# Patient Record
Sex: FEMALE | Race: WHITE | NOT HISPANIC OR LATINO | Employment: FULL TIME | URBAN - METROPOLITAN AREA
[De-identification: names, ages, dates, MRNs, and addresses within clinical notes are randomized per-mention and may not be internally consistent; named-entity substitution may affect disease eponyms.]

---

## 2021-07-08 ENCOUNTER — TELEPHONE (OUTPATIENT)
Dept: OBSTETRICS AND GYNECOLOGY | Facility: CLINIC | Age: 36
End: 2021-07-08

## 2021-07-08 NOTE — TELEPHONE ENCOUNTER
Nichelle,     She is pregnant and was seen at [facility].   Can not see where she is getting care. They found UTI.   Can you reach out and see if she wants to follow up here for ob care.     Thanks,   Dr. Anderson

## 2021-12-02 ENCOUNTER — HOSPITAL ENCOUNTER (EMERGENCY)
Facility: HOSPITAL | Age: 36
Discharge: HOME | End: 2021-12-02
Attending: OBSTETRICS & GYNECOLOGY | Admitting: OBSTETRICS & GYNECOLOGY

## 2021-12-02 VITALS
SYSTOLIC BLOOD PRESSURE: 124 MMHG | HEART RATE: 85 BPM | OXYGEN SATURATION: 100 % | BODY MASS INDEX: 45.14 KG/M2 | DIASTOLIC BLOOD PRESSURE: 86 MMHG | TEMPERATURE: 98.3 F | HEIGHT: 58 IN | RESPIRATION RATE: 16 BRPM

## 2021-12-02 LAB
ALBUMIN SERPL-MCNC: 3.3 G/DL (ref 3.5–5.2)
ALBUMIN/GLOB SERPL: 1.1 G/DL
ALP SERPL-CCNC: 95 U/L (ref 39–117)
ALT SERPL W P-5'-P-CCNC: 7 U/L (ref 1–33)
ANION GAP SERPL CALCULATED.3IONS-SCNC: 9.6 MMOL/L (ref 5–15)
AST SERPL-CCNC: 11 U/L (ref 1–32)
BASOPHILS # BLD AUTO: 0.01 10*3/MM3 (ref 0–0.2)
BASOPHILS NFR BLD AUTO: 0.1 % (ref 0–1.5)
BILIRUB SERPL-MCNC: <0.2 MG/DL (ref 0–1.2)
BILIRUB UR QL STRIP: NEGATIVE
BUN SERPL-MCNC: 9 MG/DL (ref 6–20)
BUN/CREAT SERPL: 12.3 (ref 7–25)
CALCIUM SPEC-SCNC: 8.5 MG/DL (ref 8.6–10.5)
CHLORIDE SERPL-SCNC: 108 MMOL/L (ref 98–107)
CLARITY UR: CLEAR
CO2 SERPL-SCNC: 19.4 MMOL/L (ref 22–29)
COLOR UR: YELLOW
CREAT SERPL-MCNC: 0.73 MG/DL (ref 0.57–1)
CREAT UR-MCNC: 134.1 MG/DL
DEPRECATED RDW RBC AUTO: 39 FL (ref 37–54)
EOSINOPHIL # BLD AUTO: 0.1 10*3/MM3 (ref 0–0.4)
EOSINOPHIL NFR BLD AUTO: 0.8 % (ref 0.3–6.2)
ERYTHROCYTE [DISTWIDTH] IN BLOOD BY AUTOMATED COUNT: 11.7 % (ref 12.3–15.4)
GFR SERPL CREATININE-BSD FRML MDRD: 90 ML/MIN/1.73
GLOBULIN UR ELPH-MCNC: 3 GM/DL
GLUCOSE SERPL-MCNC: 93 MG/DL (ref 65–99)
GLUCOSE UR STRIP-MCNC: NEGATIVE MG/DL
HCT VFR BLD AUTO: 30.1 % (ref 34–46.6)
HGB BLD-MCNC: 10.1 G/DL (ref 12–15.9)
HGB UR QL STRIP.AUTO: NEGATIVE
IMM GRANULOCYTES # BLD AUTO: 0.08 10*3/MM3 (ref 0–0.05)
IMM GRANULOCYTES NFR BLD AUTO: 0.7 % (ref 0–0.5)
KETONES UR QL STRIP: NEGATIVE
LEUKOCYTE ESTERASE UR QL STRIP.AUTO: NEGATIVE
LYMPHOCYTES # BLD AUTO: 2.08 10*3/MM3 (ref 0.7–3.1)
LYMPHOCYTES NFR BLD AUTO: 17 % (ref 19.6–45.3)
MCH RBC QN AUTO: 30.8 PG (ref 26.6–33)
MCHC RBC AUTO-ENTMCNC: 33.6 G/DL (ref 31.5–35.7)
MCV RBC AUTO: 91.8 FL (ref 79–97)
MONOCYTES # BLD AUTO: 0.64 10*3/MM3 (ref 0.1–0.9)
MONOCYTES NFR BLD AUTO: 5.2 % (ref 5–12)
NEUTROPHILS NFR BLD AUTO: 76.2 % (ref 42.7–76)
NEUTROPHILS NFR BLD AUTO: 9.29 10*3/MM3 (ref 1.7–7)
NITRITE UR QL STRIP: NEGATIVE
NRBC BLD AUTO-RTO: 0 /100 WBC (ref 0–0.2)
PH UR STRIP.AUTO: 6 [PH] (ref 5–8)
PLATELET # BLD AUTO: 319 10*3/MM3 (ref 140–450)
PMV BLD AUTO: 11 FL (ref 6–12)
POTASSIUM SERPL-SCNC: 4.4 MMOL/L (ref 3.5–5.2)
PROT ?TM UR-MCNC: 26 MG/DL
PROT SERPL-MCNC: 6.3 G/DL (ref 6–8.5)
PROT UR QL STRIP: ABNORMAL
PROT/CREAT UR: 193.9 MG/G CREA (ref 0–200)
RBC # BLD AUTO: 3.28 10*6/MM3 (ref 3.77–5.28)
SODIUM SERPL-SCNC: 137 MMOL/L (ref 136–145)
SP GR UR STRIP: 1.02 (ref 1–1.03)
UROBILINOGEN UR QL STRIP: ABNORMAL
WBC NRBC COR # BLD: 12.2 10*3/MM3 (ref 3.4–10.8)

## 2021-12-02 PROCEDURE — 80053 COMPREHEN METABOLIC PANEL: CPT | Performed by: OBSTETRICS & GYNECOLOGY

## 2021-12-02 PROCEDURE — 81003 URINALYSIS AUTO W/O SCOPE: CPT | Performed by: OBSTETRICS & GYNECOLOGY

## 2021-12-02 PROCEDURE — 87086 URINE CULTURE/COLONY COUNT: CPT | Performed by: OBSTETRICS & GYNECOLOGY

## 2021-12-02 PROCEDURE — 82570 ASSAY OF URINE CREATININE: CPT | Performed by: OBSTETRICS & GYNECOLOGY

## 2021-12-02 PROCEDURE — 59025 FETAL NON-STRESS TEST: CPT

## 2021-12-02 PROCEDURE — 63710000001 PROMETHAZINE PER 25 MG: Performed by: OBSTETRICS & GYNECOLOGY

## 2021-12-02 PROCEDURE — 85025 COMPLETE CBC W/AUTO DIFF WBC: CPT | Performed by: OBSTETRICS & GYNECOLOGY

## 2021-12-02 PROCEDURE — 84156 ASSAY OF PROTEIN URINE: CPT | Performed by: OBSTETRICS & GYNECOLOGY

## 2021-12-02 PROCEDURE — 99284 EMERGENCY DEPT VISIT MOD MDM: CPT | Performed by: OBSTETRICS & GYNECOLOGY

## 2021-12-02 RX ORDER — PROMETHAZINE HYDROCHLORIDE 25 MG/1
12.5 TABLET ORAL ONCE
Status: COMPLETED | OUTPATIENT
Start: 2021-12-02 | End: 2021-12-02

## 2021-12-02 RX ORDER — ACETAMINOPHEN 500 MG
1000 TABLET ORAL ONCE
Status: COMPLETED | OUTPATIENT
Start: 2021-12-02 | End: 2021-12-02

## 2021-12-02 RX ORDER — SODIUM CHLORIDE 0.9 % (FLUSH) 0.9 %
10 SYRINGE (ML) INJECTION AS NEEDED
Status: DISCONTINUED | OUTPATIENT
Start: 2021-12-02 | End: 2021-12-02 | Stop reason: HOSPADM

## 2021-12-02 RX ORDER — SODIUM CHLORIDE 0.9 % (FLUSH) 0.9 %
10 SYRINGE (ML) INJECTION EVERY 12 HOURS SCHEDULED
Status: DISCONTINUED | OUTPATIENT
Start: 2021-12-02 | End: 2021-12-02 | Stop reason: HOSPADM

## 2021-12-02 RX ORDER — FERROUS SULFATE 325(65) MG
325 TABLET ORAL
COMMUNITY
End: 2022-08-19

## 2021-12-02 RX ADMIN — ACETAMINOPHEN 1000 MG: 500 TABLET, FILM COATED ORAL at 20:30

## 2021-12-02 RX ADMIN — PROMETHAZINE HYDROCHLORIDE 12.5 MG: 25 TABLET ORAL at 20:29

## 2021-12-03 NOTE — OBED NOTES
PHAN Note OB        Patient Name: Lexis Hernandez  YOB: 1985  MRN: [id number]  Admission Date: 2021  6:47 PM  Date of Service: 2021    Chief Complaint: Elevated Blood Pressure ([facility]AN - pt presents to [facility] with c/o of elevated BPs at home, ranging 142-200/. pt reports excessive swelling in BLE and BUE, headaches. reports lower back pain. denies leaking fluid, vaginal bleeding and contractions. good fetal movement.)        Subjective     Lexis Hernandez is a 36 y.o. female  at 33w2d with Estimated Date of Delivery: 22 who presents with the chief complaint listed above.  She sees Whit Wen MD for her prenatal care. Her pregnancy has been complicated by:  patient reports high blood pressure for last month.  Unable to view outside records..    She denies a history of CHTN.  She reports more lower extremity edema over past couple of weeks, especially at work and at the end of the day.  She reports blood pressures being elevated at home today- she reports her boss took her blood pressure at work and it was in the 170s/100s and that her sister in nursing school took her BP and it was also severe range.  These cuffs have not been calibrated and patient unable to tell me if cuff correct size, etc.  Here, blood pressure has been normal to mild range.  Patient denies visual changes, RUQ/epigastric pain or shortness of breath.  She does have a mild headache that she states is typical for her.  She has not been sleeping well and thinks being tired is contributing to this.      She describes fetal movement as normal.  She denies rupture of membranes.  She denies vaginal bleeding. She is not feeling contractions.          Objective   There are no problems to display for this patient.       OB History    Para Term  AB Living   1 0 0 0 0 0   SAB IAB Ectopic Molar Multiple Live Births   0 0 0 0 0 0      # Outcome Date GA Lbr Ricardo/2nd Weight Sex Delivery Anes PTL Lv   1  Current                 Past Medical History:   Diagnosis Date   • GERD (gastroesophageal reflux disease)    • Seizures (HCC)     isolated incident in high school, migraines       Past Surgical History:   Procedure Laterality Date   • ABDOMINAL SURGERY     • ENDOSCOPY         No current facility-administered medications on file prior to encounter.     Current Outpatient Medications on File Prior to Encounter   Medication Sig Dispense Refill   • ferrous sulfate 325 (65 FE) MG tablet Take 325 mg by mouth Daily With Breakfast.     • folic acid (FOLVITE) 1 MG tablet   0 Refill(s)     • pantoprazole (PROTONIX) 20 MG EC tablet Take 20 mg by mouth Daily.     • Prenatal Vit-Fe Fumarate-FA (PRENATAL VITAMINS PO)      • promethazine (PHENERGAN) 25 MG tablet Take 1 tablet by mouth Every 8 (Eight) Hours As Needed for Nausea or Vomiting. 10 tablet 0       Allergies   Allergen Reactions   • Penicillin G Shortness Of Breath   • Diphenhydramine Hallucinations       Family History   Problem Relation Age of Onset   • Hypertension Mother    • Diabetes Father        Social History     Socioeconomic History   • Marital status: Single   Tobacco Use   • Smoking status: Never Smoker   • Smokeless tobacco: Never Used   Vaping Use   • Vaping Use: Never used   Substance and Sexual Activity   • Alcohol use: No   • Drug use: No   • Sexual activity: Defer           Review of Systems   Constitutional: Positive for activity change and fatigue. Negative for chills and fever.   HENT: Negative for congestion, rhinorrhea and sore throat.    Eyes: Negative for visual disturbance.   Respiratory: Negative.    Cardiovascular: Positive for leg swelling.   Gastrointestinal: Negative for abdominal pain, constipation, diarrhea, nausea and vomiting.   Genitourinary: Negative for difficulty urinating, dyspareunia, dysuria, flank pain, frequency, genital sores, hematuria, pelvic pain, urgency, vaginal bleeding, vaginal discharge and vaginal pain.   Neurological:  "Positive for headaches. Negative for dizziness, seizures and light-headedness.   Psychiatric/Behavioral: Negative for sleep disturbance. The patient is not nervous/anxious.           PHYSICAL EXAM:      VITAL SIGNS:  Vitals:    21 1900   BP: 144/85   Pulse: 92   Resp: 18   Temp: 98.3 °F (36.8 °C)   TempSrc: Oral   SpO2: 100%   Height: 147.3 cm (58\")        FHT'S:                   Baseline:  145 BPM  Variability:  Moderate = 6 - 25 BPM  Accelerations:  15 x 15 accelerations present     Decelerations:  absent  Contractions:   absent     Interpretation:    Reactive NST, CAT 1 tracing        PHYSICAL EXAM:    General: well developed; well nourished  no acute distress   Heart: Not performed.   Lungs  : breathing is unlabored     Abdomen: soft, non-tender; no masses  no umbilical or inguinal hernias are present  no hepato-splenomegaly       Cervix: was not checked.      Contractions: none        Extremities: 2+ bilateral pitting edema of lower extremities and hands      LABS AND TESTING ORDERED:  1. Uterine and fetal monitoring  2. Urinalysis  3. CBC, CMP, urine p/c    LAB RESULTS:    No results found for this or any previous visit (from the past 24 hour(s)).    No results found for: ABO, RH    No results found for: STREPGPB              Assessment/Plan     ASSESSMENT/PLAN:  Lexis Hernandez is a 36 y.o. female  at 33w2d who presented with: elevated blood pressure, headache, and swelling.  Headache seems to be typical headache that patient gets when fatigued.  Headache treated with phenergan and tylenol.   She has no other severe symptoms. Pre-eclampsia labs sent and normal.  Fetal status reassuring with reactive NST.  Patient monitored for almost two hours with blood pressures resolving to normal range.  Suspect gestational hypertension as current diagnosis without evidence of severe pre-eclampsia.  Patient given work note for tomorrow and she has scheduled follow-up with doctor in four days.  She was given strict " "return precautions for severe symptoms.  Discharged home.          Final Impression:  • Pregnancy at 33w2d  • Reactive NST.  CAT 1 tracing  • Gestational hypertension  • Maternal vital signs were reviewed and were as noted above             Vitals:    12/02/21 1900   BP: 144/85   Pulse: 92   Resp: 18   Temp: 98.3 °F (36.8 °C)   TempSrc: Oral   SpO2: 100%   Height: 147.3 cm (58\")   •     • No results found for: STREPGPB  • No results found for: ABO, RH  • COVID - 19 status unknown      PLAN:       I have spent 45 minutes including face to face time with the patient, greater than 50% in discussion of the diagnosis (counseling) and/or coordination of care.     Whit Wen MD  12/2/2021  19:37 EST  OB Hospitalist  Phone:  [phone]  "

## 2021-12-03 NOTE — NURSING NOTE
Pt discharged home with s/s of gestational HTN.  Pt was already aware of, but RN discussed again. Pt to keep appt on Monday with primary OB.  Pt to remain off of work until then.

## 2021-12-04 LAB — BACTERIA SPEC AEROBE CULT: NO GROWTH

## 2024-01-31 ENCOUNTER — TELEPHONE (OUTPATIENT)
Dept: URGENT CARE | Facility: CLINIC | Age: 39
End: 2024-01-31
Payer: COMMERCIAL

## 2024-05-07 ENCOUNTER — HOSPITAL ENCOUNTER (OUTPATIENT)
Facility: HOSPITAL | Age: 39
Discharge: HOME | End: 2024-05-07
Attending: EMERGENCY MEDICINE | Admitting: EMERGENCY MEDICINE
Payer: COMMERCIAL

## 2024-05-07 VITALS
BODY MASS INDEX: 39.88 KG/M2 | RESPIRATION RATE: 16 BRPM | SYSTOLIC BLOOD PRESSURE: 112 MMHG | WEIGHT: 190 LBS | HEIGHT: 58 IN | DIASTOLIC BLOOD PRESSURE: 99 MMHG | HEART RATE: 94 BPM | OXYGEN SATURATION: 98 % | TEMPERATURE: 98.7 F

## 2024-05-07 DIAGNOSIS — J06.9 UPPER RESPIRATORY TRACT INFECTION, UNSPECIFIED TYPE: Primary | ICD-10-CM

## 2024-05-07 LAB
FLUAV SUBTYP SPEC NAA+PROBE: NOT DETECTED
FLUBV RNA ISLT QL NAA+PROBE: NOT DETECTED
SARS-COV-2 RNA RESP QL NAA+PROBE: NOT DETECTED

## 2024-05-07 PROCEDURE — 25010000002 DEXAMETHASONE PER 1 MG

## 2024-05-07 PROCEDURE — 87636 SARSCOV2 & INF A&B AMP PRB: CPT | Performed by: EMERGENCY MEDICINE

## 2024-05-07 PROCEDURE — G0463 HOSPITAL OUTPT CLINIC VISIT: HCPCS

## 2024-05-07 RX ORDER — IPRATROPIUM BROMIDE AND ALBUTEROL SULFATE 2.5; .5 MG/3ML; MG/3ML
3 SOLUTION RESPIRATORY (INHALATION) ONCE
Status: COMPLETED | OUTPATIENT
Start: 2024-05-07 | End: 2024-05-07

## 2024-05-07 RX ORDER — ALBUTEROL SULFATE 90 UG/1
2 AEROSOL, METERED RESPIRATORY (INHALATION) EVERY 4 HOURS PRN
Qty: 6.7 G | Refills: 0 | Status: SHIPPED | OUTPATIENT
Start: 2024-05-07

## 2024-05-07 RX ORDER — DEXTROMETHORPHAN HYDROBROMIDE AND PROMETHAZINE HYDROCHLORIDE 15; 6.25 MG/5ML; MG/5ML
5 SYRUP ORAL 4 TIMES DAILY PRN
Qty: 118 ML | Refills: 0 | Status: SHIPPED | OUTPATIENT
Start: 2024-05-07

## 2024-05-07 RX ADMIN — DEXAMETHASONE SODIUM PHOSPHATE 10 MG: 10 INJECTION, SOLUTION INTRAMUSCULAR; INTRAVENOUS at 16:38

## 2024-05-07 RX ADMIN — IPRATROPIUM BROMIDE AND ALBUTEROL SULFATE 3 ML: 2.5; .5 SOLUTION RESPIRATORY (INHALATION) at 16:38

## 2024-11-11 ENCOUNTER — HOSPITAL ENCOUNTER (OUTPATIENT)
Facility: HOSPITAL | Age: 39
Discharge: HOME | End: 2024-11-11
Attending: STUDENT IN AN ORGANIZED HEALTH CARE EDUCATION/TRAINING PROGRAM | Admitting: NURSE PRACTITIONER
Payer: COMMERCIAL

## 2024-11-11 ENCOUNTER — APPOINTMENT (OUTPATIENT)
Dept: GENERAL RADIOLOGY | Facility: HOSPITAL | Age: 39
End: 2024-11-11
Payer: COMMERCIAL

## 2024-11-11 VITALS
HEART RATE: 74 BPM | HEIGHT: 58 IN | BODY MASS INDEX: 39.88 KG/M2 | SYSTOLIC BLOOD PRESSURE: 153 MMHG | RESPIRATION RATE: 14 BRPM | WEIGHT: 190 LBS | TEMPERATURE: 97.9 F | DIASTOLIC BLOOD PRESSURE: 112 MMHG | OXYGEN SATURATION: 100 %

## 2024-11-11 DIAGNOSIS — T14.8XXA PUNCTURE WOUND: Primary | ICD-10-CM

## 2024-11-11 PROCEDURE — G0463 HOSPITAL OUTPT CLINIC VISIT: HCPCS | Performed by: NURSE PRACTITIONER

## 2024-11-11 PROCEDURE — 73140 X-RAY EXAM OF FINGER(S): CPT

## 2024-11-11 RX ORDER — DOXYCYCLINE 100 MG/1
100 CAPSULE ORAL 2 TIMES DAILY
Qty: 20 CAPSULE | Refills: 0 | Status: SHIPPED | OUTPATIENT
Start: 2024-11-11 | End: 2024-11-21

## 2024-11-11 RX ORDER — NAPROXEN 500 MG/1
500 TABLET ORAL 2 TIMES DAILY PRN
Qty: 14 TABLET | Refills: 0 | Status: SHIPPED | OUTPATIENT
Start: 2024-11-11 | End: 2024-11-18

## 2024-11-11 NOTE — LETTER
November 13, 2024        To Whom It May Concern:    Please excuse Lexis Hernandez from work for 2 days.    We know you have a choice. Thank you for making it [facility].    Patient: Lexis Hernandez   YOB: 1985   Date of Visit: 11/11/2024                   CC: No Recipients

## 2024-11-11 NOTE — FSED PROVIDER NOTE
Subjective   History of Present Illness  38 yr female was working with a drill bit and ended up with a punch to the left thum distal joint.  Not arpita how far it went in.  Are is swollen and sore.  Says it did bleed and she cleaned it good.  Thumb is throbbing now.  No fever.  No injury to any other part of left hand.        Review of Systems   Constitutional: Negative.    Respiratory: Negative.     Musculoskeletal:  Positive for joint swelling.        Left thumb with puncture wound, swelling, and tenderness       Past Medical History:   Diagnosis Date    GERD (gastroesophageal reflux disease)     Seizures     isolated incident in high school, migraines       Allergies   Allergen Reactions    Penicillin G Shortness Of Breath    Diphenhydramine Hallucinations    Pseudoeph-Doxylamine-Dm-Apap Rash       Past Surgical History:   Procedure Laterality Date    ABDOMINAL SURGERY      ENDOSCOPY         Family History   Problem Relation Age of Onset    Hypertension Mother     Diabetes Father        Social History     Socioeconomic History    Marital status: Single   Tobacco Use    Smoking status: Never    Smokeless tobacco: Never   Vaping Use    Vaping status: Never Used   Substance and Sexual Activity    Alcohol use: No    Drug use: No    Sexual activity: Defer           Objective   Physical Exam    Procedures           ED Course                                           Medical Decision Making      Final diagnoses:   Puncture wound       ED Disposition  ED Disposition       ED Disposition   Discharge    Condition   Stable    Comment   --               PATIENT CONNECTION - [facility] [address]  [phone]    As needed, If symptoms worsen         Medication List        New Prescriptions      doxycycline 100 MG capsule  Commonly known as: MONODOX  Take 1 capsule by mouth 2 (Two) Times a Day for 10 days.     naproxen 500 MG tablet  Commonly known as: NAPROSYN  Take 1 tablet by mouth 2 (Two) Times a Day As  Needed for Mild Pain for up to 7 days.               Where to Get Your Medications        These medications were sent to [facility] - [place], [place] - [address] - [phone]  - [phone] [address] [place], [facility] [address]      Phone: [phone]   doxycycline 100 MG capsule  naproxen 500 MG tablet

## 2024-11-14 PROBLEM — R56.9 SEIZURE: Status: ACTIVE | Noted: 2024-11-14

## 2024-11-14 PROBLEM — K21.00 GASTRO-ESOPHAGEAL REFLUX DISEASE WITH ESOPHAGITIS, WITHOUT BLEEDING: Status: ACTIVE | Noted: 2022-04-11

## 2024-11-14 PROBLEM — A49.9 INFECTION DUE TO EXTENDED SPECTRUM BETA LACTAMASE (ESBL) PRODUCING BACTERIA: Status: ACTIVE | Noted: 2024-11-14

## 2024-11-14 PROBLEM — G40.909 EPILEPSY: Status: ACTIVE | Noted: 2022-04-13

## 2024-11-14 PROBLEM — F33.1 MODERATE RECURRENT MAJOR DEPRESSION: Status: ACTIVE | Noted: 2024-11-14

## 2024-11-14 PROBLEM — K21.9 GASTROESOPHAGEAL REFLUX DISEASE: Status: ACTIVE | Noted: 2024-11-14

## 2024-11-14 PROBLEM — K21.9 GASTROESOPHAGEAL REFLUX DISEASE: Status: ACTIVE | Noted: 2023-02-02

## 2024-11-14 PROBLEM — I10 HYPERTENSION: Status: ACTIVE | Noted: 2022-04-13

## 2024-11-14 PROBLEM — Z16.12 INFECTION DUE TO EXTENDED SPECTRUM BETA LACTAMASE (ESBL) PRODUCING BACTERIA: Status: ACTIVE | Noted: 2024-11-14

## 2025-01-30 ENCOUNTER — HOSPITAL ENCOUNTER (OUTPATIENT)
Facility: HOSPITAL | Age: 40
Discharge: HOME | End: 2025-01-30
Attending: EMERGENCY MEDICINE | Admitting: EMERGENCY MEDICINE
Payer: COMMERCIAL

## 2025-01-30 VITALS
OXYGEN SATURATION: 96 % | TEMPERATURE: 98 F | WEIGHT: 185 LBS | SYSTOLIC BLOOD PRESSURE: 92 MMHG | HEART RATE: 64 BPM | HEIGHT: 58 IN | RESPIRATION RATE: 14 BRPM | DIASTOLIC BLOOD PRESSURE: 55 MMHG | BODY MASS INDEX: 38.83 KG/M2

## 2025-01-30 DIAGNOSIS — Z77.29 NATURAL GAS EXPOSURE: Primary | ICD-10-CM

## 2025-01-30 DIAGNOSIS — R51.9 ACUTE NONINTRACTABLE HEADACHE, UNSPECIFIED HEADACHE TYPE: ICD-10-CM

## 2025-01-30 PROCEDURE — 99213 OFFICE O/P EST LOW 20 MIN: CPT | Performed by: EMERGENCY MEDICINE

## 2025-01-30 PROCEDURE — G0463 HOSPITAL OUTPT CLINIC VISIT: HCPCS | Performed by: EMERGENCY MEDICINE

## 2025-01-30 RX ORDER — IBUPROFEN 600 MG/1
600 TABLET, FILM COATED ORAL ONCE
Status: COMPLETED | OUTPATIENT
Start: 2025-01-30 | End: 2025-01-30

## 2025-01-30 RX ADMIN — IBUPROFEN 600 MG: 600 TABLET, FILM COATED ORAL at 08:16

## 2025-01-30 NOTE — DISCHARGE INSTRUCTIONS
Continue taking ibuprofen 400 mg to 600 mg every 6 hours with food or fluid for headache and ache.  Follow-up with your primary care doctor as needed.  If anything changes you are welcome return here.

## 2025-01-30 NOTE — FSED PROVIDER NOTE
Subjective   History of Present Illness  38 yo female exposed to gas last night and also this morning gas and started having frontal headache last night and continues this morning when she had gone back to her home and open all the doors and windows waiting for the gas company who did not note a gas leak.  There was a question if it was an abundance of chlorine.  She says she does not use bleach in her house.  No nausea or vomiting or diarrhea.  No stiff or sore neck or rash.  No coughing or coughing up any products.  No chest pain back pain or abdominal pain.  When she left the house last night and stayed with her parents, the symptoms continued.      Review of Systems    Past Medical History:   Diagnosis Date    GERD (gastroesophageal reflux disease)     Seizures     isolated incident in high school, migraines       Allergies   Allergen Reactions    Penicillin G Shortness Of Breath    Diphenhydramine Hallucinations    Pseudoeph-Doxylamine-Dm-Apap Rash       Past Surgical History:   Procedure Laterality Date    ABDOMINAL SURGERY      ENDOSCOPY         Family History   Problem Relation Age of Onset    Hypertension Mother     Diabetes Father        Social History     Socioeconomic History    Marital status: Single   Tobacco Use    Smoking status: Never    Smokeless tobacco: Never   Vaping Use    Vaping status: Never Used   Substance and Sexual Activity    Alcohol use: No    Drug use: No    Sexual activity: Defer           Objective   Physical Exam  Vitals and nursing note reviewed.   Constitutional:       General: She is not in acute distress.     Appearance: Normal appearance. She is normal weight. She is not ill-appearing.   HENT:      Mouth/Throat:      Pharynx: Oropharynx is clear.   Eyes:      General:         Right eye: No discharge.         Left eye: No discharge.      Extraocular Movements: Extraocular movements intact.      Conjunctiva/sclera: Conjunctivae normal.   Cardiovascular:      Rate and Rhythm:  Normal rate and regular rhythm.      Pulses: Normal pulses.      Heart sounds: Normal heart sounds.   Pulmonary:      Effort: Pulmonary effort is normal. No respiratory distress.      Breath sounds: Normal breath sounds. No stridor. No wheezing, rhonchi or rales.   Musculoskeletal:         General: Normal range of motion.      Cervical back: Neck supple.   Skin:     General: Skin is warm and dry.      Capillary Refill: Capillary refill takes less than 2 seconds.   Neurological:      General: No focal deficit present.      Mental Status: She is alert and oriented to person, place, and time. Mental status is at baseline.   Psychiatric:         Mood and Affect: Mood normal.         Behavior: Behavior normal.         Thought Content: Thought content normal.         Judgment: Judgment normal.         Procedures           ED Course                                           Medical Decision Making  Patient was exposed to some kind of gas, will offer oxygen to see if we can displace and also give ibuprofen for the headache.  Do not think lab work is useful at this time nor will it really show us was going on.    Continue taking ibuprofen 400 mg to 600 mg every 6 hours with food or fluid for headache and ache.  Follow-up with your primary care doctor as needed.  If anything changes you are welcome return here.    She understood and agreed    O2 sat is 99%, patient is some better, labs likely at this late stage likely will not reveal anything    You were given Oxygen for treatment and Ibuprofen. You can continue Ibuprofen 600 mg every 6 hours with food or fluid and twice today also Tylenol 650 mg. Hydration is a big key to improvement especially for headaches. Be sure and drink at least 4 liters by 5 pm today. Please follow up with your PCP within a week. You are always welcome to return here.    She understood and agreed.    Problems Addressed:  Natural gas exposure: complicated acute illness or injury    Risk  Prescription  drug management.        Final diagnoses:   Natural gas exposure   Acute nonintractable headache, unspecified headache type       ED Disposition  ED Disposition       ED Disposition   Discharge    Condition   Stable    Comment   --               Provider, No Known  [place] [phone]      You do not have a physician you can call this number and they can help you find 1.         Medication List      No changes were made to your prescriptions during this visit.

## 2025-01-30 NOTE — Clinical Note
[facility]  [address]  [place] [address]    Lexis Hernandez was seen and treated in our emergency department on 1/30/2025.  She may return to work on 01/31/2025.         Thank you for choosing [facility].    Bria Briceno MD